# Patient Record
Sex: MALE | Race: WHITE | NOT HISPANIC OR LATINO | Employment: FULL TIME | URBAN - METROPOLITAN AREA
[De-identification: names, ages, dates, MRNs, and addresses within clinical notes are randomized per-mention and may not be internally consistent; named-entity substitution may affect disease eponyms.]

---

## 2022-01-28 ENCOUNTER — HOSPITAL ENCOUNTER (EMERGENCY)
Facility: CLINIC | Age: 27
Discharge: HOME | End: 2022-01-28
Attending: EMERGENCY MEDICINE | Admitting: EMERGENCY MEDICINE
Payer: COMMERCIAL

## 2022-01-28 VITALS
OXYGEN SATURATION: 99 % | TEMPERATURE: 97.8 F | DIASTOLIC BLOOD PRESSURE: 82 MMHG | HEART RATE: 64 BPM | SYSTOLIC BLOOD PRESSURE: 132 MMHG | RESPIRATION RATE: 18 BRPM

## 2022-01-28 DIAGNOSIS — K08.89 PAIN, DENTAL: ICD-10-CM

## 2022-01-28 PROCEDURE — 250N000009 HC RX 250

## 2022-01-28 PROCEDURE — 99284 EMERGENCY DEPT VISIT MOD MDM: CPT | Mod: 25

## 2022-01-28 PROCEDURE — 64450 NJX AA&/STRD OTHER PN/BRANCH: CPT

## 2022-01-28 RX ORDER — BUPIVACAINE HYDROCHLORIDE AND EPINEPHRINE 5; 5 MG/ML; UG/ML
INJECTION, SOLUTION PERINEURAL
Status: DISCONTINUED
Start: 2022-01-28 | End: 2022-01-28 | Stop reason: HOSPADM

## 2022-01-28 RX ORDER — IBUPROFEN 600 MG/1
600 TABLET, FILM COATED ORAL EVERY 6 HOURS PRN
Qty: 30 TABLET | Refills: 1 | Status: SHIPPED | OUTPATIENT
Start: 2022-01-28

## 2022-01-28 RX ORDER — PENICILLIN V POTASSIUM 500 MG/1
500 TABLET, FILM COATED ORAL 4 TIMES DAILY
Qty: 40 TABLET | Refills: 0 | Status: SHIPPED | OUTPATIENT
Start: 2022-01-28 | End: 2022-02-07

## 2022-01-28 RX ORDER — HYDROCODONE BITARTRATE AND ACETAMINOPHEN 5; 325 MG/1; MG/1
1 TABLET ORAL EVERY 6 HOURS PRN
Qty: 10 TABLET | Refills: 0 | Status: SHIPPED | OUTPATIENT
Start: 2022-01-28

## 2022-01-28 NOTE — ED PROVIDER NOTES
History   Chief Complaint:  Dental pain     HPI   Rogelio Damian is an otherwise healthy 26 year old male who presents with right upper tooth pain that started yesterday at 4PM. States he had pain the day before as well, which had resolved. Tried Advil Dual Action without relief. Denies past medical history and medication allergies. No other illness or injury reported.     Review of Systems   HENT: Positive for dental problem.    All other systems reviewed and are negative.    Allergies:  The patient has no known allergies.     Medications:  Seroquel    Past Medical History:     Depression  VUR  Encopresis with constipation and overflow incontinence  Bladder wall thickening  Borderline intellectual functioning  Mood disorder NOS  Oppositional defiant disorder of childhood or adolescence  Suicidal thoughts  Short stature  ADHD  Depressive disorder      Past Surgical History:    [facility] OR RX DEFLUX 1ML syringe gel  Myringotomy, insert tube(s), adenoidectomy, combined  Tonsillectomy     Social History:  The patient presents to the ED alone. He drove himself.   The patient is not COVID vaccinated per the [facility].     Physical Exam     Patient Vitals for the past 24 hrs:   BP Temp Temp src Pulse Resp SpO2   01/28/22 0149 132/82 97.8  F (36.6  C) Temporal 64 18 99 %       Physical Exam  Vitals reviewed.   HENT:      Head: Normocephalic.      Right Ear: Tympanic membrane normal.      Left Ear: Tympanic membrane normal.      Mouth/Throat:      Comments: There is poor dentition with multiple caries.  There is tap tenderness over the right tooth #4.  There is no fluctuance in the gingiva.  Cardiovascular:      Rate and Rhythm: Normal rate.   Pulmonary:      Effort: Pulmonary effort is normal.   Neurological:      Mental Status: He is alert.           Emergency Department Course     Procedures    Dental Block     INDICATIONS:  Dental Pain    LOCATION:  Right upper maxilla  ANESTHESIA: Regional block using bupivacaine 0.5% with  epinephrine, total of 1 mLs   PROCEDURE NOTE: The patient tolerated the procedure well with good relief of discomfort and there were no complications.    Emergency Department Course:         Reviewed:  I reviewed nursing notes, vitals, past medical history, Care Everywhere and [facility]    Assessments:  0153 I obtained history and examined the patient as noted above.   0200 I attempted the procedure as above.   0218 I rechecked the patient and explained findings. He was still feeling severe dental pain. I attempted the procedure again as above.   0242 I rechecked the patient. He is feeling much better.     Disposition:  The patient was discharged to home.     Impression & Plan     Medical Decision Making:  Patient presents with dental pain.  Examination is suspicious for apical either abscess or pulpitis.  Patient was offered a dental block first 1 failed as I was to anterior to the affected tooth second block was performed and improved.  Patient was offered reassurance and follow-up with dentistry medication for pain and penicillin was offered as a bridge to follow-up with primary dental care.      Diagnosis:    ICD-10-CM    1. Pain, dental  K08.89        Discharge Medications:  New Prescriptions    HYDROCODONE-ACETAMINOPHEN (NORCO) 5-325 MG TABLET    Take 1 tablet by mouth every 6 hours as needed for pain    IBUPROFEN (ADVIL/MOTRIN) 600 MG TABLET    Take 1 tablet (600 mg) by mouth every 6 hours as needed for moderate pain    PENICILLIN V (VEETID) 500 MG TABLET    Take 1 tablet (500 mg) by mouth 4 times daily for 10 days       Scribe Disclosure:  Telma URIBE, am serving as a scribe at 1:56 AM on 1/28/2022 to document services personally performed by Az Ordoñez MD based on my observations and the provider's statements to me.      Az Ordoñez MD  01/28/22 0562

## 2022-01-28 NOTE — DISCHARGE INSTRUCTIONS
We have given you a dental block for dental pain.  As we have discussed the emergency room does not have the ability to drill filler pulled teeth.  Please use antibiotic as treatment for infection of the tooth.  Please use the medication if needed.  Please follow-up with a dentist ASAP for reassessment of severe dental pain.